# Patient Record
(demographics unavailable — no encounter records)

---

## 2025-02-25 NOTE — HISTORY OF PRESENT ILLNESS
[de-identified] : Ms. Schneider is a 70 year old woman with asthma who presents for a sick visit via telehealth.  She has been having nasal congestion for the past few weeks. Spoke with SCOTTY Laird who sent Flonase, azelastine and zyrtec.  Pt tried all of these but no improvement overall.  Sinus pressure on the top of her head. Eye pressure. Nose is now painful and has continued congestion. No fever. Unsure if she has green nasal drainage as she cannot see. Has had this in the past.  Now drinking lactaid milk and feels a bit better.  PCN listed in her chart as an allergy but a course of augmentin had been prescribed in 2019. Pt cannot recall when her reported PCN allergic reaction occurred.